# Patient Record
Sex: MALE | Race: WHITE | Employment: FULL TIME | URBAN - METROPOLITAN AREA
[De-identification: names, ages, dates, MRNs, and addresses within clinical notes are randomized per-mention and may not be internally consistent; named-entity substitution may affect disease eponyms.]

---

## 2017-12-06 PROBLEM — I48.91 ATRIAL FIBRILLATION (HCC): Status: ACTIVE | Noted: 2017-06-02

## 2024-02-05 ENCOUNTER — OFFICE VISIT (OUTPATIENT)
Age: 64
End: 2024-02-05
Payer: COMMERCIAL

## 2024-02-05 VITALS — BODY MASS INDEX: 29.93 KG/M2 | WEIGHT: 221 LBS | HEIGHT: 72 IN

## 2024-02-05 DIAGNOSIS — Z87.442 HISTORY OF KIDNEY STONES: ICD-10-CM

## 2024-02-05 DIAGNOSIS — N52.8 OTHER MALE ERECTILE DYSFUNCTION: Primary | ICD-10-CM

## 2024-02-05 DIAGNOSIS — Z85.46 PERSONAL HISTORY OF MALIGNANT NEOPLASM OF PROSTATE: ICD-10-CM

## 2024-02-05 LAB
BILIRUBIN, POC: NORMAL
BLOOD URINE, POC: NORMAL
CLARITY, POC: CLEAR
COLOR, POC: YELLOW
GLUCOSE URINE, POC: NORMAL
KETONES, POC: NORMAL
LEUKOCYTE EST, POC: NORMAL
NITRITE, POC: NORMAL
PH, POC: NORMAL
PROSTATE SPECIFIC ANTIGEN: 0.01 NG/ML
PROTEIN, POC: NORMAL
SPECIFIC GRAVITY, POC: NORMAL
UROBILINOGEN, POC: NORMAL

## 2024-02-05 PROCEDURE — 81003 URINALYSIS AUTO W/O SCOPE: CPT | Performed by: SPECIALIST

## 2024-02-05 PROCEDURE — 1123F ACP DISCUSS/DSCN MKR DOCD: CPT | Performed by: SPECIALIST

## 2024-02-05 PROCEDURE — 99214 OFFICE O/P EST MOD 30 MIN: CPT | Performed by: SPECIALIST

## 2024-02-05 NOTE — PROGRESS NOTES
Vladimir Mujica MD FACS    [facility] Urology Office Progress Note    Patient:  Jose Miguel Rodríguez  YOB: 1960  Date: 2/5/2024    HISTORY OF PRESENT ILLNESS:   The patient is a 64 y.o. male  No evidence of prostate cancer recurrence s/p 3/20/07 Robotic assisted laparoscopic radical prostatectomy since PSA is <0.01.  At this point, no further urologic follow up required.  PCP to do yearly PSA's and refer back if these are not 0.01.  BPH symptoms not bad enough to warrant medical Rx.   No flank pain or gross hematuria to suggest recurrent kidney stones.   Lower urinary tract symptoms: urgency, frequency, decreased urinary stream, and nocturia, 2 times per night   Last AUA Symptom Score (QOL): 4 (1)  Today's AUA Symptom Score (QOL): 7 (2)    Summary of old records:   ED: Cialis and Viagra don't work; Trimix (10/30/1) 2/7/22; (40/30/1) 2/6/23  History of prostate cancer: T1c T2c N0 G3+4=7, RALP 3/20/07  History of kidney stones: 9/13/13, 1/25/21 KUB neg  Left 1.5 cm hyperdense cyst on 3/21/13 MRI    Additional History: none    Procedures Today: N/A    Urinalysis today:  Results for POC orders placed in visit on 02/05/24   POCT Urinalysis No Micro (Auto)   Result Value Ref Range    Color, UA yellow     Clarity, UA clear     Glucose, UA POC >= 1000mg     Bilirubin, UA      Ketones, UA      Spec Grav, UA      Blood, UA POC neg     pH, UA      Protein, UA  mg     Urobilinogen, UA      Leukocytes, UA neg     Nitrite, UA neg        Last several PSA's:  Lab Results   Component Value Date    PSA 0.01 01/26/2024    PSA 0.01 01/30/2023    PSA 0.01 02/01/2022       Last total testosterone:  No results found for: \"TESTOSTERONE\"    Last BUN and creatinine:1/31/24  Component  Ref Range & Units 5 d ago Comments   Portable creatinine  0.7 - 1.2 mg/dL 0.9 METHOD TRACEABLE TO IDMS STANDARD   eGFR (CKD-EPI)non-race dependent  >59 ml/min/1.73sq.m >90          Last CBC: 1/26/24  Component  Ref Range &

## 2024-11-15 ENCOUNTER — TELEPHONE (OUTPATIENT)
Age: 64
End: 2024-11-15

## 2024-11-15 NOTE — TELEPHONE ENCOUNTER
Shyam called regarding Mr. Rodríguez's last colonoscopy. She stated that his last colonoscopy was from [facility] and was hoping we had his report. Writer informed her that there is a process that needs to be done prior to receiving the op note. Writer faxed a blank Carlos and information form to their office.     Marcos Rea., MA